# Patient Record
Sex: MALE | Race: WHITE | NOT HISPANIC OR LATINO | Employment: FULL TIME | URBAN - METROPOLITAN AREA
[De-identification: names, ages, dates, MRNs, and addresses within clinical notes are randomized per-mention and may not be internally consistent; named-entity substitution may affect disease eponyms.]

---

## 2024-01-16 ENCOUNTER — APPOINTMENT (OUTPATIENT)
Dept: CT IMAGING | Facility: CLINIC | Age: 74
End: 2024-01-16
Attending: EMERGENCY MEDICINE
Payer: COMMERCIAL

## 2024-01-16 ENCOUNTER — HOSPITAL ENCOUNTER (EMERGENCY)
Facility: CLINIC | Age: 74
Discharge: HOME | End: 2024-01-16
Attending: EMERGENCY MEDICINE | Admitting: EMERGENCY MEDICINE
Payer: COMMERCIAL

## 2024-01-16 VITALS
RESPIRATION RATE: 18 BRPM | DIASTOLIC BLOOD PRESSURE: 75 MMHG | WEIGHT: 182 LBS | HEART RATE: 68 BPM | OXYGEN SATURATION: 98 % | TEMPERATURE: 98.3 F | SYSTOLIC BLOOD PRESSURE: 132 MMHG

## 2024-01-16 DIAGNOSIS — R31.0 GROSS HEMATURIA: ICD-10-CM

## 2024-01-16 DIAGNOSIS — R33.8 ACUTE URINARY RETENTION: ICD-10-CM

## 2024-01-16 DIAGNOSIS — N32.89 BLOOD CLOT IN BLADDER: ICD-10-CM

## 2024-01-16 PROBLEM — N13.8 BENIGN PROSTATIC HYPERPLASIA WITH URINARY OBSTRUCTION: Status: ACTIVE | Noted: 2022-12-20

## 2024-01-16 PROBLEM — N20.0 RENAL STONES: Status: ACTIVE | Noted: 2023-08-08

## 2024-01-16 PROBLEM — N40.1 BENIGN PROSTATIC HYPERPLASIA WITH URINARY OBSTRUCTION: Status: ACTIVE | Noted: 2022-12-20

## 2024-01-16 LAB
ALBUMIN UR-MCNC: >499 MG/DL
APPEARANCE UR: ABNORMAL
BILIRUB UR QL STRIP: NEGATIVE
COLOR UR AUTO: ABNORMAL
GLUCOSE UR STRIP-MCNC: NEGATIVE MG/DL
HGB UR QL STRIP: ABNORMAL
KETONES UR STRIP-MCNC: NEGATIVE MG/DL
LEUKOCYTE ESTERASE UR QL STRIP: NEGATIVE
NITRATE UR QL: NEGATIVE
PH UR STRIP: 6 [PH] (ref 5–7)
RBC URINE: >182 /HPF
SP GR UR STRIP: 1.02 (ref 1–1.03)
UROBILINOGEN UR STRIP-MCNC: NORMAL MG/DL
WBC URINE: 0 /HPF

## 2024-01-16 PROCEDURE — 51798 US URINE CAPACITY MEASURE: CPT | Performed by: EMERGENCY MEDICINE

## 2024-01-16 PROCEDURE — 81001 URINALYSIS AUTO W/SCOPE: CPT | Performed by: EMERGENCY MEDICINE

## 2024-01-16 PROCEDURE — 74176 CT ABD & PELVIS W/O CONTRAST: CPT

## 2024-01-16 PROCEDURE — 99284 EMERGENCY DEPT VISIT MOD MDM: CPT | Mod: 25 | Performed by: EMERGENCY MEDICINE

## 2024-01-16 PROCEDURE — 51702 INSERT TEMP BLADDER CATH: CPT | Performed by: EMERGENCY MEDICINE

## 2024-01-16 PROCEDURE — 99284 EMERGENCY DEPT VISIT MOD MDM: CPT | Performed by: EMERGENCY MEDICINE

## 2024-01-16 ASSESSMENT — ACTIVITIES OF DAILY LIVING (ADL)
ADLS_ACUITY_SCORE: 35
ADLS_ACUITY_SCORE: 35

## 2024-01-16 NOTE — ED TRIAGE NOTES
He woke up this morning with pain that is severe in his lower abdomin and penis.  He is also unable to void and has a lot of pressure and pain to try.

## 2024-01-16 NOTE — DISCHARGE INSTRUCTIONS
Go directly to your urologist clinic in [place].  It is okay to drink some water but please do not eat anything just in case a procedure needs to be done.    I hope that everything goes very smoothly!!

## 2024-01-17 NOTE — ED PROVIDER NOTES
History     Chief Complaint   Patient presents with    Abdominal Pain     HPI  History per patient, medical records    This is a 73-year-old male, history of prostate cancer status postradiation treatment, multiple kidney stones, presenting with abdominal pain.  Patient states that for the last couple of days he has had decreased urination/difficulty passing urine.  This morning he was unable to pass any urine and he has had increasing lower abdominal/suprapubic pain.  He was at last able to pass bright red blood.  He is not on any blood thinners but is on baby aspirin.  He had recent lithotripsy in August for large bilateral kidney stones.  He denies any flank pain and denies any nausea or vomiting or other symptoms that he has had in the past associated with kidney stones.  No fevers or chills.  No chest pain, shortness of breath, cold or cough symptoms.  Pain in the lower abdomen and pelvis extends to his penis.      Allergies:  No Known Allergies    Problem List:    Patient Active Problem List    Diagnosis Date Noted    Renal stones 08/08/2023     Priority: Medium    Benign prostatic hyperplasia with urinary obstruction 12/20/2022     Priority: Medium    Malignant tumor of prostate (H) 04/13/2015     Priority: Medium     C61 : Malignant neoplasm of prostate - Notes:Father PSA 8/17 - 8.13(16.6) 8/17 Gr 4+5=9 80% Lt apex rest neg 9/17 Bone scan neg one Lupron 30 mg 12/17 - [facility]Rx - finished 6 d ago bad HFs PSA -0.07 8/18 - 0.09          Past Medical History:    No past medical history on file.    Past Surgical History:    No past surgical history on file.    Family History:    No family history on file.    Social History:  Marital Status:   [2]        Medications:    ASPIRIN ADULT LOW STRENGTH PO          Review of Systems  All other ROS reviewed and are negative or non-contributory except as stated in HPI.     Physical Exam   BP: (!) 171/88  Pulse: 89  Temp: 98.3  F (36.8  C)  Resp: 18  Weight: 82.6 kg  (182 lb)  SpO2: 98 %      Physical Exam  Vitals and nursing note reviewed.   Constitutional:       Appearance: He is well-developed and normal weight.      Comments: Uncomfortable appearing male standing in the room attempting to pass urine   HENT:      Head: Normocephalic.      Nose: Nose normal.   Eyes:      General: No scleral icterus.     Extraocular Movements: Extraocular movements intact.   Cardiovascular:      Rate and Rhythm: Normal rate and regular rhythm.      Pulses: Normal pulses.   Pulmonary:      Effort: Pulmonary effort is normal.   Genitourinary:     Comments: Circumcised.  Yesica blood passed into urinal  Musculoskeletal:         General: Normal range of motion.      Cervical back: Normal range of motion and neck supple.   Skin:     General: Skin is warm and dry.      Coloration: Skin is not pale.   Neurological:      General: No focal deficit present.      Mental Status: He is alert.   Psychiatric:         Mood and Affect: Mood normal.         Behavior: Behavior normal.         ED Course (with Medical Decision Making)    Pt seen and examined by me.  RN and EPIC notes reviewed.       Patient with urinary retention, passing yesica blood.  History of kidney stones but no flank pain.  He notes most of the discomfort is from being unable to urinate.  No fevers or chills.  He had lithotripsy in August.  History of prostate cancer and radiation.    We did a quick bladder scan and there was at least 400 cc noted.  I am concerned that patient is unable to pass urine because of a blood clot.  Attempt was made to pass a three-way catheter for bladder irrigation but catheter unable to be advanced so a coudé was placed.  Patient had improvement in his symptomatology after the catheter.  Urine was sent for evaluation.    I also ordered a CT scan because of the recent kidney stones.  This showed large blood clot within the urinary bladder adjacent to the Salcido catheter.  No visualized clot in the upper urinary  tract system.  He has some nonobstructing kidney stones in both kidneys up to 3 mm measurement.    I did speak with Dr. Castillo.  Patient does not usually see our facility for urology.  He suspects this is likely radiation cystitis.  He recommends patient call for close follow-up with his usual urologist.    I spoke with the care team for [facility] urology.  They graciously reviewed patient's chart and recommended that he come to the [facility] for evaluation and management.  They may be able to place a larger catheter and irrigate.  Otherwise patient would need to go to the ED at [facility].  I discussed this thought and plan with the patient and his wife.  He is more comfortable now and feels okay to be discharged and travel via private car to the urology clinic in [place].  Salcido catheter left in place.  UA notable for large amount of blood.      Procedures    Results for orders placed or performed during the hospital encounter of 01/16/24 (from the past 24 hour(s))   UA with Microscopic reflex to Culture    Specimen: Urine, Salcido Catheter   Result Value Ref Range    Color Urine Red (A) Colorless, Straw, Light Yellow, Yellow    Appearance Urine Cloudy (A) Clear    Glucose Urine Negative Negative mg/dL    Bilirubin Urine Negative Negative    Ketones Urine Negative Negative mg/dL    Specific Gravity Urine 1.022 1.003 - 1.035    Blood Urine Small (A) Negative    pH Urine 6.0 5.0 - 7.0    Protein Albumin Urine >499 (A) Negative mg/dL    Urobilinogen Urine Normal Normal, 2.0 mg/dL    Nitrite Urine Negative Negative    Leukocyte Esterase Urine Negative Negative    RBC Urine >182 (H) <=2 /HPF    WBC Urine 0 <=5 /HPF    Narrative    Urine Culture not indicated   Abd/pelvis CT - no contrast - Stone Protocol    Narrative    CT ABDOMEN PELVIS W/O CONTRAST 1/16/2024 9:21 AM    CLINICAL HISTORY: Hematuria, urinary retention, history of lithotripsy  for kidney stones in August  TECHNIQUE: CT scan of the  abdomen and pelvis was performed without IV  contrast. Multiplanar reformats were obtained. Dose reduction  techniques were used.  CONTRAST: None.    COMPARISON: None    FINDINGS:   LOWER CHEST: Mild scarring in the lung bases.    HEPATOBILIARY: Few small benign hepatic cysts measuring up to 2.0 cm  in diameter.    PANCREAS: Normal.    SPLEEN: Normal.    ADRENAL GLANDS: Normal.    KIDNEYS/BLADDER: A Salcido catheter is present within the bladder. A  large blood clot is seen within the urinary bladder measuring 6.2 x  3.1 x 3.3 cm. No definite blood clots in the upper tracts. No  hydronephrosis. Both kidneys are normal in size. There are 4  nonobstructing calyceal stones in the right kidney measuring up to 3  mm. There are 7 nonobstructing calyceal stones in the left kidney  measuring up to 2 mm. Few small benign left renal cyst for which no  follow-up is needed.    BOWEL: Colonic diverticulosis without active inflammation. Bowel loops  are normal caliber.    LYMPH NODES: Normal.    VASCULATURE: Unremarkable.    PELVIC ORGANS: Mild enlargement of the prostate gland.    OTHER: No adenopathy. No free fluid.    MUSCULOSKELETAL: Previous left inguinal hernia repair. Degenerative  changes in the lumbar spine.      Impression    IMPRESSION:   1.  Large blood clot within the urinary bladder adjacent to a Salcido  catheter.  2.  No visualized but a clot seen within the upper urinary tracts. No  hydronephrosis.  3.  Nonobstructing calyceal stones in both kidneys measuring up to 3  mm.    LIU ALICIA MD         SYSTEM ID:  [id number]       Medications - No data to display    Assessments & Plan      I have reviewed the findings, diagnosis, plan and need for follow up with the patient.  Discharge Medication List as of 1/16/2024 12:04 PM          Final diagnoses:   Gross hematuria   Acute urinary retention   Blood clot in bladder     Disposition: Patient discharged to urology clinic in stable condition.  Plan as above.  Return for  concerns.     Note: Chart documentation done in part with Dragon Voice Recognition software. Although reviewed after completion, some word and grammatical errors may remain.     1/16/2024   [facility] EMERGENCY DEPT       Vilma Leone MD  01/16/24 2059

## 2024-03-23 ENCOUNTER — HEALTH MAINTENANCE LETTER (OUTPATIENT)
Age: 74
End: 2024-03-23

## 2025-04-12 ENCOUNTER — HEALTH MAINTENANCE LETTER (OUTPATIENT)
Age: 75
End: 2025-04-12